# Patient Record
Sex: FEMALE | Race: WHITE | Employment: OTHER | URBAN - METROPOLITAN AREA
[De-identification: names, ages, dates, MRNs, and addresses within clinical notes are randomized per-mention and may not be internally consistent; named-entity substitution may affect disease eponyms.]

---

## 2017-01-27 ENCOUNTER — HOSPITAL ENCOUNTER (OUTPATIENT)
Dept: CT IMAGING | Age: 72
Discharge: HOME | End: 2017-01-27
Attending: FAMILY MEDICINE
Payer: MEDICARE

## 2017-01-27 DIAGNOSIS — R42 DIZZINESS: ICD-10-CM

## 2017-01-27 PROCEDURE — 70450 CT HEAD/BRAIN W/O DYE: CPT

## 2017-01-30 PROBLEM — Z82.49 FAMILY HISTORY OF CARDIOVASCULAR DISEASE: Status: ACTIVE | Noted: 2017-01-30

## 2017-02-21 PROBLEM — Q21.12 PFO (PATENT FORAMEN OVALE): Status: ACTIVE | Noted: 2017-02-21

## 2017-11-14 ENCOUNTER — HOSPITAL ENCOUNTER (OUTPATIENT)
Dept: MAMMOGRAPHY | Age: 72
Discharge: HOME | End: 2017-11-14
Attending: FAMILY MEDICINE
Payer: MEDICARE

## 2017-11-14 DIAGNOSIS — Z12.31 ENCOUNTER FOR SCREENING MAMMOGRAM FOR MALIGNANT NEOPLASM OF BREAST: ICD-10-CM

## 2017-11-14 PROCEDURE — 77067 SCR MAMMO BI INCL CAD: CPT

## 2018-04-10 ENCOUNTER — ANESTHESIA EVENT (OUTPATIENT)
Dept: ENDOSCOPY | Age: 73
End: 2018-04-10
Payer: MEDICARE

## 2018-04-11 ENCOUNTER — HOSPITAL ENCOUNTER (OUTPATIENT)
Age: 73
Setting detail: OUTPATIENT SURGERY
Discharge: HOME | End: 2018-04-11
Attending: INTERNAL MEDICINE | Admitting: INTERNAL MEDICINE
Payer: MEDICARE

## 2018-04-11 ENCOUNTER — ANESTHESIA (OUTPATIENT)
Dept: ENDOSCOPY | Age: 73
End: 2018-04-11
Payer: MEDICARE

## 2018-04-11 VITALS
BODY MASS INDEX: 25.46 KG/M2 | DIASTOLIC BLOOD PRESSURE: 86 MMHG | RESPIRATION RATE: 18 BRPM | HEART RATE: 54 BPM | HEIGHT: 68 IN | SYSTOLIC BLOOD PRESSURE: 160 MMHG | TEMPERATURE: 98 F | WEIGHT: 168 LBS | OXYGEN SATURATION: 99 %

## 2018-04-11 LAB — GLUCOSE BLD STRIP.AUTO-MCNC: 115 MG/DL (ref 65–100)

## 2018-04-11 PROCEDURE — 82962 GLUCOSE BLOOD TEST: CPT

## 2018-04-11 PROCEDURE — 74011000250 HC RX REV CODE- 250

## 2018-04-11 PROCEDURE — 76040000025: Performed by: INTERNAL MEDICINE

## 2018-04-11 PROCEDURE — 76060000032 HC ANESTHESIA 0.5 TO 1 HR: Performed by: INTERNAL MEDICINE

## 2018-04-11 PROCEDURE — 88305 TISSUE EXAM BY PATHOLOGIST: CPT | Performed by: INTERNAL MEDICINE

## 2018-04-11 PROCEDURE — 74011250636 HC RX REV CODE- 250/636: Performed by: ANESTHESIOLOGY

## 2018-04-11 PROCEDURE — 74011250636 HC RX REV CODE- 250/636

## 2018-04-11 PROCEDURE — 77030009426 HC FCPS BIOP ENDOSC BSC -B: Performed by: INTERNAL MEDICINE

## 2018-04-11 RX ORDER — SODIUM CHLORIDE, SODIUM LACTATE, POTASSIUM CHLORIDE, CALCIUM CHLORIDE 600; 310; 30; 20 MG/100ML; MG/100ML; MG/100ML; MG/100ML
100 INJECTION, SOLUTION INTRAVENOUS CONTINUOUS
Status: DISCONTINUED | OUTPATIENT
Start: 2018-04-11 | End: 2018-04-11 | Stop reason: HOSPADM

## 2018-04-11 RX ORDER — PROPOFOL 10 MG/ML
INJECTION, EMULSION INTRAVENOUS
Status: DISCONTINUED | OUTPATIENT
Start: 2018-04-11 | End: 2018-04-11 | Stop reason: HOSPADM

## 2018-04-11 RX ORDER — PROPOFOL 10 MG/ML
INJECTION, EMULSION INTRAVENOUS AS NEEDED
Status: DISCONTINUED | OUTPATIENT
Start: 2018-04-11 | End: 2018-04-11 | Stop reason: HOSPADM

## 2018-04-11 RX ORDER — LIDOCAINE HYDROCHLORIDE 20 MG/ML
INJECTION, SOLUTION EPIDURAL; INFILTRATION; INTRACAUDAL; PERINEURAL AS NEEDED
Status: DISCONTINUED | OUTPATIENT
Start: 2018-04-11 | End: 2018-04-11 | Stop reason: HOSPADM

## 2018-04-11 RX ORDER — SODIUM CHLORIDE 0.9 % (FLUSH) 0.9 %
5-10 SYRINGE (ML) INJECTION AS NEEDED
Status: DISCONTINUED | OUTPATIENT
Start: 2018-04-11 | End: 2018-04-11 | Stop reason: HOSPADM

## 2018-04-11 RX ADMIN — LIDOCAINE HYDROCHLORIDE 50 MG: 20 INJECTION, SOLUTION EPIDURAL; INFILTRATION; INTRACAUDAL; PERINEURAL at 12:16

## 2018-04-11 RX ADMIN — SODIUM CHLORIDE, SODIUM LACTATE, POTASSIUM CHLORIDE, AND CALCIUM CHLORIDE: 600; 310; 30; 20 INJECTION, SOLUTION INTRAVENOUS at 12:08

## 2018-04-11 RX ADMIN — PROPOFOL 30 MG: 10 INJECTION, EMULSION INTRAVENOUS at 12:18

## 2018-04-11 RX ADMIN — PROPOFOL 200 MCG/KG/MIN: 10 INJECTION, EMULSION INTRAVENOUS at 12:16

## 2018-04-11 RX ADMIN — SODIUM CHLORIDE, SODIUM LACTATE, POTASSIUM CHLORIDE, AND CALCIUM CHLORIDE 100 ML/HR: 600; 310; 30; 20 INJECTION, SOLUTION INTRAVENOUS at 11:55

## 2018-04-11 RX ADMIN — PROPOFOL 50 MG: 10 INJECTION, EMULSION INTRAVENOUS at 12:16

## 2018-04-11 NOTE — ANESTHESIA PREPROCEDURE EVALUATION
Anesthetic History               Review of Systems / Medical History  Patient summary reviewed and pertinent labs reviewed    Pulmonary                   Neuro/Psych       CVA (Recent difficulty finding words)  TIA and psychiatric history (Anxiety)     Cardiovascular    Hypertension: well controlled                Comments: 3 Mets without SOB    PFO      Denies CP, SOB or changes in functional status   GI/Hepatic/Renal  Within defined limits              Endo/Other        Arthritis     Other Findings   Comments: Chronic fatigue             Physical Exam    Airway  Mallampati: II  TM Distance: 4 - 6 cm  Neck ROM: normal range of motion   Mouth opening: Normal     Cardiovascular    Rhythm: regular  Rate: normal      Pertinent negatives: No murmur, JVD and peripheral edema   Dental    Dentition: Caps/crowns     Pulmonary  Breath sounds clear to auscultation               Abdominal  GI exam deferred       Other Findings            Anesthetic Plan    ASA: 3  Anesthesia type: total IV anesthesia          Induction: Intravenous  Anesthetic plan and risks discussed with: Patient      Will plan for extreme caution with IV tubing and air given PFO.

## 2018-04-11 NOTE — PROCEDURES
[facility] [address]  PROCEDURE NOTE    Adela Lowery  MR#: [id number]  : 1945  ACCOUNT #: [de-identified]   DATE OF SERVICE: 2018    SUBJECTIVE:  A 31-year-old female is to undergo colonoscopy with a prior history of colon polyps. Colonoscopy is done today to confirm the presence or absence of recurrent polypoid lesions. ANESTHESIA:  As per MAC anesthesia. INSTRUMENT:  PCF (pediatric) H190L videocolonoscope. PROCEDURE:  The patient was placed in left lateral decubitus position. Rectal examination was performed and was unremarkable. The pediatric colonoscope was inserted in the rectal vault and the procedure was begun. Under direct visualization, the cecum was reached and ileocecal valve was identified. We had to traverse a quite tortuous sigmoid colon, which was diverticular filled. The pediatric scope was quite useful traversing this area. The endoscope was removed from the cecum. The cecum was unremarkable. In the proximal ascending colon, there was a tattoo previously placed. It was still evident today. Adjacent to this, there was a small (approximately 3 mm) polyp that was biopsied away completely. After adequate hemostasis, the remaining ascending colon was visualized and was unremarkable. The transverse, TC, and sigmoid colon were also unremarkable except for the sigmoid diverticulosis. In the rectum, retroflexed view of the anal verge revealed no other abnormalities. The endoscope was removed from the patient. The patient tolerated the procedure well and was taken to the recovery area in stable condition. IMPRESSION  1. Small ascending colon polyp that was biopsied away. 2.  Significant sigmoid diverticular disease, diverticulosis. 3.  Tortuous angulated sigmoid colon. 4.  Right ascending colon tattoo in place. PLAN AND DIAGNOSTIC:    1. Follow up biopsy.   2.  Followup colonoscopy in 3-5 years depending on results of biopsy. THERAPEUTIC:  High fiber, low fat diet.       MD JADIEL Goss / CHIARA  D: 04/11/2018 12:56     T: 04/11/2018 15:51  JOB #: [id number]  CC: Andrez Gonzalez MD  PARTNER MD  [address] [place] [address],[address] [place] [address]

## 2018-04-11 NOTE — ANESTHESIA POSTPROCEDURE EVALUATION
Post-Anesthesia Evaluation and Assessment    Patient: Pao Mccord MRN: [id number]  SSN: [id number]    YOB: 1945  Age: 67 y.o. Sex: female       Cardiovascular Function/Vital Signs  Visit Vitals    BP 94/51    Pulse 60    Temp 36.7 °C (98 °F)    Resp 14    Ht 5' 7.5\" (1.715 m)    Wt 76.2 kg (168 lb)    SpO2 92%    Breastfeeding No    BMI 25.92 kg/m2       Patient is status post total IV anesthesia anesthesia for Procedure(s):  COLONOSCOPY  BMI 26  ENDOSCOPIC POLYPECTOMY. Nausea/Vomiting: None    Postoperative hydration reviewed and adequate. Pain:  Pain Scale 1: Numeric (0 - 10) (04/11/18 1124)  Pain Intensity 1: 0 (04/11/18 1124)   Managed    Neurological Status: At baseline    Mental Status and Level of Consciousness: Arousable    Pulmonary Status:   O2 Device: Nasal cannula (04/11/18 1249)   Adequate oxygenation and airway patent    Complications related to anesthesia: None    Post-anesthesia assessment completed.  No concerns    Signed By: Steven Kasper MD     April 11, 2018

## 2018-04-11 NOTE — IP AVS SNAPSHOT
[address] 
[phone] Patient: Prasanna Felton MRN: [id number] :1945 About your hospitalization You were admitted on:  2018 You last received care in the:  [facility] You were discharged on:  2018 Why you were hospitalized Your primary diagnosis was:  Not on File Follow-up Information Follow up With Details Comments Contact Info Darla Councilman, MD   [address] Partner MD [place] [address] [phone] Discharge Orders None A check norm indicates which time of day the medication should be taken. My Medications CHANGE how you take these medications Instructions Each Dose to Equal  
 Morning Noon Evening Bedtime  
 dilTIAZem  mg ER capsule Commonly known as:  CARDIZEM CD What changed:  when to take this Your last dose was: Your next dose is: Take 1 Cap by mouth daily. 180 mg CONTINUE taking these medications Instructions Each Dose to Equal  
 Morning Noon Evening Bedtime  
 aspirin delayed-release 81 mg tablet Your last dose was: Your next dose is: Take 81 mg by mouth daily (after lunch). 81 mg  
    
   
   
   
  
 CRESTOR 10 mg tablet Generic drug:  rosuvastatin Your last dose was: Your next dose is: Take 10 mg by mouth nightly. 10 mg  
    
   
   
   
  
 irbesartan 75 mg tablet Commonly known as:  AVAPRO Your last dose was: Your next dose is: Take 75 mg by mouth nightly. 75 mg KLOR-CON M20 20 mEq tablet Generic drug:  potassium chloride Your last dose was: Your next dose is: Take  by mouth two (2) times a day. L-Methylfolate-Y26-Heiwnwxdcr tablet Commonly known as:  Beverly Miranda Your last dose was: Your next dose is: Take 1 Tab by mouth. 1 Tab  
    
   
   
   
  
 montelukast 10 mg tablet Commonly known as:  SINGULAIR Your last dose was: Your next dose is: Take 10 mg by mouth daily (after lunch). 10 mg  
    
   
   
   
  
 oxazepam 15 mg capsule Commonly known as:  SERAX Your last dose was: Your next dose is: Take 15 mg by mouth nightly as needed for Anxiety. 15 mg  
    
   
   
   
  
 TRIAMTERENE-HYDROCHLOROTHIAZIDE 37.5 MG-25 MG CAP Your last dose was: Your next dose is:    
   
   
 1/2 tab qam and 1/2 tab qpm  
     
   
   
   
  
 zolpidem 5 mg tablet Commonly known as:  AMBIEN Your last dose was: Your next dose is: Take 5 mg by mouth nightly as needed for Sleep.  
 5 mg ZyrTEC 10 mg Cap Generic drug:  Cetirizine Your last dose was: Your next dose is: Take  by mouth. Discharge Instructions Gastrointestinal Colonoscopy/Flexible Sigmoidoscopy - Lower Exam Discharge Instructions 1. Call Dr. Leslie Foster for any problems or questions. 2. Contact the doctors office for follow up appointment as directed 3. Medication may cause drowsiness for several hours, therefore, do not drive or operate machinery for remainder of the day. 4. No alcohol today. 5. Ordinarily, you may resume regular diet and activity after exam unless otherwise specified by your physician. 6. Because of air put into your colon during exam, you may experience some abdominal distension, relieved by the passage of gas, for several hours. 7. Contact your physician if you have any of the following: 
a. Excessive amount of bleeding  large amount when having a bowel movement.   Occasional specks of blood with bowel movement would not be unusual. 
 b. Severe abdominal pain 
c. Fever or Chills 8. Polyp Removal  follow these additional instructions 
a. Take Metamucil  1 tablespoon in juice every morning for 3 days b. No Aspirin, Advil, Aleve, Nuprin, Ibuprofen, or medications that contain these drugs for 2 weeks. Any additional instructions: Follow up biopsies. Next colonoscopy in 3-5 years. High fiber diet. Instructions given to Maribeth Davison and other family members. Instructions given by:  Dr. Geoff Anton Introducing [facility]! [employer] introduces [employer] patient portal. Now you can access parts of your medical record, email your doctor's office, and request medication refills online. 1. In your internet browser, go to [email or web address] 2. Click on the First Time User? Click Here link in the Sign In box. You will see the New Member Sign Up page. 3. Enter your [employer] Access Code exactly as it appears below. You will not need to use this code after youve completed the sign-up process. If you do not sign up before the expiration date, you must request a new code. · Market [employer] Access Code: [email or web address] Expires: 7/8/2018  4:25 PM 
 
4. Enter the last four digits of your Social Security Number (xxxx) and Date of Birth (mm/dd/yyyy) as indicated and click Submit. You will be taken to the next sign-up page. 5. Create a Market Wire ID. This will be your Market Wire login ID and cannot be changed, so think of one that is secure and easy to remember. 6. Create a [employer] password. You can change your password at any time. 7. Enter your Password Reset Question and Answer. This can be used at a later time if you forget your password. 8. Enter your e-mail address. You will receive e-mail notification when new information is available in [address] 9. Click Sign Up. You can now view and download portions of your medical record.  
10. Click the Download Summary menu link to download a portable copy of your medical information. If you have questions, please visit the Frequently Asked Questions section of the [employer] website. Remember, [facility] is NOT to be used for urgent needs. For medical emergencies, dial 911. Now available from your iPhone and Android! Introducing Reji Valadez As a [facility] [employer] [employer] [facility] patient, I wanted to make you aware of our electronic visit tool called Reji Valadez. [employer] allows you to connect within minutes with a medical provider 24 hours a day, seven days a week via a mobile device or tablet or logging into a secure website from your computer. You can access Reji Valadez from anywhere in the [place]. A virtual visit might be right for you when you have a simple condition and feel like you just dont want to get out of bed, or cant get away from work for an appointment, when your regular [facility] provider is not available (evenings, weekends or holidays), or when youre out of town and need minor care. Electronic visits cost only $49 and if the [employer] provider determines a prescription is needed to treat your condition, one can be electronically transmitted to a nearby pharmacy*. Please take a moment to enroll today if you have not already done so. The enrollment process is free and takes just a few minutes. To enroll, please download the [employer] dante to your tablet or phone, or visit [email or web address] to enroll on your computer. And, as an [address] [place] [address] patient with a F[employer] account, the results of your visits will be scanned into your electronic medical record and your primary care provider will be able to view the scanned results. We urge you to continue to see your regular [facility] provider for your ongoing medical care.   And while your primary care provider may not be the one available when you seek a Reji Valadez virtual visit, the peace of mind you get from getting a real diagnosis real time can be priceless. For more information on Reji Valadez, view our Frequently Asked Questions (FAQs) at [email or web address]. Sincerely, 
 
Yomi Richardson MD 
Chief Medical Officer 50[address] *:  certain medications cannot be prescribed via Reji Valadez Providers Seen During Your Hospitalization Provider Specialty Primary office phone Rosealee Baumgarten, MD Gastroenterology [phone] Your Primary Care Physician (PCP) Primary Care Physician Office Phone Office Fax Yamilex Colindres [phone] You are allergic to the following Allergen Reactions Lisinopril Cough Sulfa (Sulfonamide Antibiotics) Rash Recent Documentation Height Weight Breastfeeding? BMI OB Status Smoking Status 1.715 m 76.2 kg No 25.92 kg/m2 Hysterectomy Former Smoker Emergency Contacts Name Discharge Info Relation Home Work Mobile Aracelis Earing  Child [2] [phone] Debbie Escobedo  Other Relative [6] [phone] Patient Belongings The following personal items are in your possession at time of discharge: 
  Dental Appliances: None  Visual Aid: Glasses Please provide this summary of care documentation to your next provider. Signatures-by signing, you are acknowledging that this After Visit Summary has been reviewed with you and you have received a copy. Patient Signature:  ____________________________________________________________ Date:  ____________________________________________________________  
  
Krystle Brennan Provider Signature:  ____________________________________________________________ Date:  ____________________________________________________________

## 2018-04-11 NOTE — ROUTINE PROCESS
Discharge instructions given to daughter in law. IV discontinued with skin c/d/i. Pt has passed flatus and tolerating liquids well. Pt ready for discharge.

## 2018-04-11 NOTE — H&P
Gastrointestinal Progress Note    4/11/2018    Admit Date: 4/11/2018    Subjective:     Patient is NPO for a colonoscopy. Pain: Patient complains of no bdominal pain. Bowel Movements: Normal    Bleeding:  None    Current Facility-Administered Medications   Medication Dose Route Frequency    lactated Ringers infusion  100 mL/hr IntraVENous CONTINUOUS    lactated Ringers infusion  100 mL/hr IntraVENous CONTINUOUS    sodium chloride (NS) flush 5-10 mL  5-10 mL IntraVENous PRN        Objective:     Blood pressure 168/79, pulse (!) 56, temperature 97.5 °F (36.4 °C), resp. rate 16, height 5' 7.5\" (1.715 m), weight 76.2 kg (168 lb), SpO2 94 %, not currently breastfeeding. EXAM:  HEART: regular rate and rhythm, S1, S2 normal, no murmur, click, rub or gallop, LUNGS: chest clear, no wheezing, rales, normal symmetric air entry, Heart exam - S1, S2 normal, no murmur, no gallop, rate regular and ABDOMEN:  Bowel sounds are normal, liver is not enlarged, spleen is not enlarged    Data Review    Recent Results (from the past 24 hour(s))   GLUCOSE, POC    Collection Time: 04/11/18 11:53 AM   Result Value Ref Range    Glucose (POC) 115 (H) 65 - 100 mg/dL       Assessment:     Active Problems:  History of colon polyps  PFO  History of CVA      Plan:     Colonoscopy--risks (bleed,perforation, infection, untoward CV or resp. Event, mortality, etc.), benefits and alternatives were discussed with the patient who agrees to proceed.   Truong Cervantes MD

## 2018-04-11 NOTE — DISCHARGE INSTRUCTIONS
Gastrointestinal Colonoscopy/Flexible Sigmoidoscopy - Lower Exam Discharge Instructions  1. Call Dr. Geena Castaneda for any problems or questions. 2. Contact the doctors office for follow up appointment as directed  3. Medication may cause drowsiness for several hours, therefore, do not drive or operate machinery for remainder of the day. 4. No alcohol today. 5. Ordinarily, you may resume regular diet and activity after exam unless otherwise specified by your physician. 6. Because of air put into your colon during exam, you may experience some abdominal distension, relieved by the passage of gas, for several hours. 7. Contact your physician if you have any of the following:  a. Excessive amount of bleeding - large amount when having a bowel movement. Occasional specks of blood with bowel movement would not be unusual.  b. Severe abdominal pain  c. Fever or Chills  8. Polyp Removal - follow these additional instructions  a. Take Metamucil - 1 tablespoon in juice every morning for 3 days  b. No Aspirin, Advil, Aleve, Nuprin, Ibuprofen, or medications that contain these drugs for 2 weeks. Any additional instructions: Follow up biopsies. Next colonoscopy in 3-5 years. High fiber diet. Instructions given to Herbert Arenas and other family members.   Instructions given by:  Dr. Geena Castaneda

## 2019-12-30 ENCOUNTER — HOSPITAL ENCOUNTER (OUTPATIENT)
Dept: MAMMOGRAPHY | Age: 74
Discharge: HOME | End: 2019-12-30
Attending: FAMILY MEDICINE
Payer: MEDICARE

## 2019-12-30 DIAGNOSIS — Z12.31 VISIT FOR SCREENING MAMMOGRAM: ICD-10-CM

## 2019-12-30 PROCEDURE — 77067 SCR MAMMO BI INCL CAD: CPT

## 2021-07-14 ENCOUNTER — HOSPITAL ENCOUNTER (OUTPATIENT)
Dept: LAB | Age: 76
Discharge: HOME | End: 2021-07-14

## 2021-07-14 PROCEDURE — 88305 TISSUE EXAM BY PATHOLOGIST: CPT

## 2022-03-18 PROBLEM — Z82.49 FAMILY HISTORY OF CARDIOVASCULAR DISEASE: Status: ACTIVE | Noted: 2017-01-30

## 2022-03-18 PROBLEM — Q21.12 PFO (PATENT FORAMEN OVALE): Status: ACTIVE | Noted: 2017-02-21

## 2022-05-02 ENCOUNTER — HOSPITAL ENCOUNTER (EMERGENCY)
Age: 77
Discharge: HOME | End: 2022-05-02
Attending: EMERGENCY MEDICINE
Payer: MEDICARE

## 2022-05-02 VITALS
TEMPERATURE: 98.1 F | SYSTOLIC BLOOD PRESSURE: 159 MMHG | HEIGHT: 67 IN | WEIGHT: 180 LBS | DIASTOLIC BLOOD PRESSURE: 82 MMHG | BODY MASS INDEX: 28.25 KG/M2 | HEART RATE: 58 BPM | OXYGEN SATURATION: 95 % | RESPIRATION RATE: 16 BRPM

## 2022-05-02 DIAGNOSIS — R04.0 EPISTAXIS: Primary | ICD-10-CM

## 2022-05-02 LAB
APTT PPP: 33.3 SEC (ref 24.1–35.1)
BASOPHILS # BLD: 0 K/UL (ref 0–0.2)
BASOPHILS NFR BLD: 0 % (ref 0–2)
DIFFERENTIAL METHOD BLD: ABNORMAL
EOSINOPHIL # BLD: 0.1 K/UL (ref 0–0.8)
EOSINOPHIL NFR BLD: 1 % (ref 0.5–7.8)
ERYTHROCYTE [DISTWIDTH] IN BLOOD BY AUTOMATED COUNT: 14.7 % (ref 11.9–14.6)
HCT VFR BLD AUTO: 41.6 % (ref 35.8–46.3)
HGB BLD-MCNC: 13.7 G/DL (ref 11.7–15.4)
IMM GRANULOCYTES # BLD AUTO: 0 K/UL (ref 0–0.5)
IMM GRANULOCYTES NFR BLD AUTO: 0 % (ref 0–5)
INR PPP: 1
LYMPHOCYTES # BLD: 1.7 K/UL (ref 0.5–4.6)
LYMPHOCYTES NFR BLD: 21 % (ref 13–44)
MCH RBC QN AUTO: 27.9 PG (ref 26.1–32.9)
MCHC RBC AUTO-ENTMCNC: 32.9 G/DL (ref 31.4–35)
MCV RBC AUTO: 84.7 FL (ref 79.6–97.8)
MONOCYTES # BLD: 0.9 K/UL (ref 0.1–1.3)
MONOCYTES NFR BLD: 12 % (ref 4–12)
NEUTS SEG # BLD: 5.3 K/UL (ref 1.7–8.2)
NEUTS SEG NFR BLD: 66 % (ref 43–78)
NRBC # BLD: 0 K/UL (ref 0–0.2)
PLATELET # BLD AUTO: 302 K/UL (ref 150–450)
PMV BLD AUTO: 9.3 FL (ref 9.4–12.3)
PROTHROMBIN TIME: 13.2 SEC (ref 12.6–14.5)
RBC # BLD AUTO: 4.91 M/UL (ref 4.05–5.2)
WBC # BLD AUTO: 8 K/UL (ref 4.3–11.1)

## 2022-05-02 PROCEDURE — 74011250637 HC RX REV CODE- 250/637: Performed by: PHYSICIAN ASSISTANT

## 2022-05-02 PROCEDURE — 85025 COMPLETE CBC W/AUTO DIFF WBC: CPT

## 2022-05-02 PROCEDURE — 99283 EMERGENCY DEPT VISIT LOW MDM: CPT

## 2022-05-02 PROCEDURE — 85730 THROMBOPLASTIN TIME PARTIAL: CPT

## 2022-05-02 PROCEDURE — 85610 PROTHROMBIN TIME: CPT

## 2022-05-02 RX ORDER — OXYMETAZOLINE HCL 0.05 %
2 SPRAY, NON-AEROSOL (ML) NASAL
Status: COMPLETED | OUTPATIENT
Start: 2022-05-02 | End: 2022-05-02

## 2022-05-02 RX ADMIN — OXYMETAZOLINE HCL 2 SPRAY: 0.05 SPRAY NASAL at 19:53

## 2022-05-02 NOTE — DISCHARGE INSTRUCTIONS
Your blood work is normal.  If you go home and have another episode of bleeding I would recommend using 2 squirts of Afrin in each nostril and then nasal clamping your nose for 20 to 30 minutes at a time. Slightly tilts her head forward not backward so you do not swallow the blood. If bleeding continues more than a few hours or you feel lightheaded or dizzy feel free to return to the ER.   I would also hold your Zyrtec and Flonase for the next week if possible as this can increase your likelihood of having a nosebleed

## 2022-05-02 NOTE — ED PROVIDER NOTES
Pt arrives by EMS for epistaxis that began around 1pm after blowing her nose. She had bleeding from the left nostril. Pt takes Brillinta, aspirin, flonase, and zyrtec. Pt saw her PCP today who couldn't get the bleeding to stop. He tried ice and some pressure. Her PCP told her to stop the Brilinta for a few days as well as aspirin. Pt has had nasal clamp on for 1.5 hours. Bleeding has improved now in triage.   Patient denies feeling lightheaded or dizzy             Past Medical History:   Diagnosis Date    Anxiety     Arthritis     osteo , in feet and low back    Chronic fatigue syndrome     Chronic pain     arthritic    Colonic adenoma 4/13    removed at colonoscopy    Family history of cardiovascular disease 1/30/2017    Hypertension     Pain of right great toe     with bone spurs    Patent foramen ovale     tiney via echo from 3/2015    Psychiatric disorder     depression, controlled by medication    Stroke ([facility] [address]     2013, 7 mm in left thalmus    Transient ischemic attack (TIA)     1 TIA 1/2015 and 6 TIA in June of this year , no residual weakness occassionlly difficulty getting words       Past Surgical History:   Procedure Laterality Date    COLONOSCOPY N/A 4/11/2018    COLONOSCOPY  BMI 26 performed by Arsenio Mondragon MD at [facility] ENDOSCOPY    HX GI      polyp removal via colonoscopy    HX GYN      tubal ligation    HX HYSTERECTOMY  age 50, 12   [de-identified] TONSILLECTOMY  age 35    tonsills removed         Family History:   Problem Relation Age of Onset    Lung Disease Mother     Heart Disease Father     Breast Cancer Neg Hx        Social History     Socioeconomic History    Marital status:      Spouse name: Not on file    Number of children: Not on file    Years of education: Not on file    Highest education level: Not on file   Occupational History    Not on file   Tobacco Use    Smoking status: Former Smoker     Packs/day: 4.00     Years: 0.50     Pack years: 2.00     Quit date: 1970     Years since quittin.3    Smokeless tobacco: Never Used   Substance and Sexual Activity    Alcohol use: Yes     Comment: occasionally    Drug use: No    Sexual activity: Never   Other Topics Concern    Not on file   Social History Narrative    Not on file     Social Determinants of Health     Financial Resource Strain:     Difficulty of Paying Living Expenses: Not on file   Food Insecurity:     Worried About Running Out of Food in the Last Year: Not on file    Crystal of Food in the Last Year: Not on file   Transportation Needs:     Lack of Transportation (Medical): Not on file    Lack of Transportation (Non-Medical): Not on file   Physical Activity:     Days of Exercise per Week: Not on file    Minutes of Exercise per Session: Not on file   Stress:     Feeling of Stress : Not on file   Social Connections:     Frequency of Communication with Friends and Family: Not on file    Frequency of Social Gatherings with Friends and Family: Not on file    Attends Synagogue Services: Not on file    Active Member of [address] [place] [address] or Organizations: Not on file    Attends Club or Organization Meetings: Not on file    Marital Status: Not on file   Intimate Partner Violence:     Fear of Current or Ex-Partner: Not on file    Emotionally Abused: Not on file    Physically Abused: Not on file    Sexually Abused: Not on file   Housing Stability:     Unable to Pay for Housing in the Last Year: Not on file    Number of [place] in the Last Year: Not on file    Unstable Housing in the Last Year: Not on file         ALLERGIES: Lisinopril and Sulfa (sulfonamide antibiotics)    Review of Systems   HENT:        Epistaxis   Neurological: Negative for dizziness and headaches. All other systems reviewed and are negative.       Vitals:    22 1643   BP: (!) 142/77   Pulse: (!) 58   Resp: 16   Temp: 98.1 °F (36.7 °C)   SpO2: 95%   Weight: 81.6 kg (180 lb)   Height: 5' 7\" (1.702 m) Physical Exam  Vitals and nursing note reviewed. Constitutional:       Appearance: Normal appearance. HENT:      Head: Normocephalic and atraumatic. Nose:      Comments: There is dried blood in both nostrils slightly worse on the left side but no active bleeding as of 1930. Exact source of bleeding cannot be visualized at this time. Mouth/Throat:      Mouth: Mucous membranes are moist.      Comments: No blood in the posterior oropharynx  Eyes:      Extraocular Movements: Extraocular movements intact. Conjunctiva/sclera: Conjunctivae normal.      Pupils: Pupils are equal, round, and reactive to light. Cardiovascular:      Rate and Rhythm: Normal rate and regular rhythm. Pulses: Normal pulses. Heart sounds: Normal heart sounds. Pulmonary:      Effort: Pulmonary effort is normal.      Breath sounds: Normal breath sounds. Musculoskeletal:         General: Normal range of motion. Cervical back: Normal range of motion and neck supple. Skin:     General: Skin is warm and dry. Capillary Refill: Capillary refill takes less than 2 seconds. Neurological:      General: No focal deficit present. Mental Status: She is alert. Psychiatric:         Mood and Affect: Mood normal.         Behavior: Behavior normal.         Thought Content: Thought content normal.          MDM  Number of Diagnoses or Management Options  Diagnosis management comments: Differential diagnoses: Epistaxis, anemia, thrombocytopenia    Due to high patient volume patient did have to wait in the waiting room for nearly 3 hours. When I saw her a second time around 1930 she had had the nasal clamp on for over an hour and a half. Clamp was removed and patient has no active bleeding. I will send her home with Afrin in case she has any repeat bleeding but she would prefer to not have her nostril packed at all cost as she states she did have it packed many years ago and could not tolerate it.   Patient to hold her Zyrtec and Flonase as this can also increase risk of epistaxis and her primary care physician already told her to hold her Brilinta and aspirin for a few days       Amount and/or Complexity of Data Reviewed  Clinical lab tests: reviewed    Risk of Complications, Morbidity, and/or Mortality  Presenting problems: low  Diagnostic procedures: low  Management options: low    Patient Progress  Patient progress: improved         Procedures

## 2022-05-02 NOTE — ED NOTES
Pt arrives by EMS for epistaxis that began around 1pm after blowing her nose. She had bleeding from the left nostril. Pt takes Brillinta and aspirin. Pt saw her PCP today who couldn't get the bleeding to stop. Pt has had nasal clamp for 1.5 hours. Bleeding has improved now. Minimal bleeding noted in left nostril. Patient evaluated initially in triage. Rapid Medical Evaluation was conducted and necessary orders have been placed. I have performed a medical screening exam.  Care will now be transferred to the attending physician in the emergency department.   JESIKA Fernando 4:45 PM

## 2022-05-03 NOTE — ED NOTES
I have reviewed discharge instructions with the patient. The patient verbalized understanding. Patient left ED via Discharge Method: ambulatory to Home with son. Opportunity for questions and clarification provided. Patient given 0 scripts. To continue your aftercare when you leave the hospital, you may receive an automated call from our care team to check in on how you are doing. This is a free service and part of our promise to provide the best care and service to meet your aftercare needs.  If you have questions, or wish to unsubscribe from this service please call [phone]. Thank you for Choosing our [address] [place] [address] Emergency Department.

## 2022-10-26 ENCOUNTER — HOSPITAL ENCOUNTER (EMERGENCY)
Age: 77
Discharge: HOME | End: 2022-10-26
Attending: EMERGENCY MEDICINE
Payer: MEDICARE

## 2022-10-26 VITALS
DIASTOLIC BLOOD PRESSURE: 69 MMHG | TEMPERATURE: 98 F | WEIGHT: 180 LBS | RESPIRATION RATE: 17 BRPM | OXYGEN SATURATION: 97 % | SYSTOLIC BLOOD PRESSURE: 121 MMHG | BODY MASS INDEX: 28.25 KG/M2 | HEIGHT: 67 IN | HEART RATE: 60 BPM

## 2022-10-26 DIAGNOSIS — R04.0 EPISTAXIS: Primary | ICD-10-CM

## 2022-10-26 DIAGNOSIS — E87.6 HYPOKALEMIA: ICD-10-CM

## 2022-10-26 LAB
ANION GAP SERPL CALC-SCNC: 6 MMOL/L (ref 2–11)
BASOPHILS # BLD: 0 K/UL (ref 0–0.2)
BASOPHILS NFR BLD: 0 % (ref 0–2)
BUN SERPL-MCNC: 11 MG/DL (ref 8–23)
CALCIUM SERPL-MCNC: 9.1 MG/DL (ref 8.3–10.4)
CHLORIDE SERPL-SCNC: 103 MMOL/L (ref 101–110)
CO2 SERPL-SCNC: 28 MMOL/L (ref 21–32)
CREAT SERPL-MCNC: 0.72 MG/DL (ref 0.6–1)
DIFFERENTIAL METHOD BLD: NORMAL
EOSINOPHIL # BLD: 0.1 K/UL (ref 0–0.8)
EOSINOPHIL NFR BLD: 1 % (ref 0.5–7.8)
ERYTHROCYTE [DISTWIDTH] IN BLOOD BY AUTOMATED COUNT: 14.1 % (ref 11.9–14.6)
GLUCOSE SERPL-MCNC: 102 MG/DL (ref 65–100)
HCT VFR BLD AUTO: 40.8 % (ref 35.8–46.3)
HGB BLD-MCNC: 13.3 G/DL (ref 11.7–15.4)
IMM GRANULOCYTES # BLD AUTO: 0 K/UL (ref 0–0.5)
IMM GRANULOCYTES NFR BLD AUTO: 0 % (ref 0–5)
INR PPP: 1
LYMPHOCYTES # BLD: 1.3 K/UL (ref 0.5–4.6)
LYMPHOCYTES NFR BLD: 18 % (ref 13–44)
MCH RBC QN AUTO: 28 PG (ref 26.1–32.9)
MCHC RBC AUTO-ENTMCNC: 32.6 G/DL (ref 31.4–35)
MCV RBC AUTO: 85.9 FL (ref 82–102)
MONOCYTES # BLD: 0.7 K/UL (ref 0.1–1.3)
MONOCYTES NFR BLD: 10 % (ref 4–12)
NEUTS SEG # BLD: 5.1 K/UL (ref 1.7–8.2)
NEUTS SEG NFR BLD: 70 % (ref 43–78)
NRBC # BLD: 0 K/UL (ref 0–0.2)
PLATELET # BLD AUTO: 251 K/UL (ref 150–450)
PMV BLD AUTO: 9.6 FL (ref 9.4–12.3)
POTASSIUM SERPL-SCNC: 3.3 MMOL/L (ref 3.5–5.1)
PROTHROMBIN TIME: 13.6 SEC (ref 12.6–14.3)
RBC # BLD AUTO: 4.75 M/UL (ref 4.05–5.2)
SODIUM SERPL-SCNC: 137 MMOL/L (ref 133–143)
WBC # BLD AUTO: 7.2 K/UL (ref 4.3–11.1)

## 2022-10-26 PROCEDURE — 85610 PROTHROMBIN TIME: CPT

## 2022-10-26 PROCEDURE — 99283 EMERGENCY DEPT VISIT LOW MDM: CPT

## 2022-10-26 PROCEDURE — 85025 COMPLETE CBC W/AUTO DIFF WBC: CPT

## 2022-10-26 PROCEDURE — 80048 BASIC METABOLIC PNL TOTAL CA: CPT

## 2022-10-26 PROCEDURE — 6370000000 HC RX 637 (ALT 250 FOR IP): Performed by: NURSE PRACTITIONER

## 2022-10-26 RX ORDER — POTASSIUM CHLORIDE 20 MEQ/1
40 TABLET, EXTENDED RELEASE ORAL ONCE
Status: COMPLETED | OUTPATIENT
Start: 2022-10-26 | End: 2022-10-26

## 2022-10-26 RX ADMIN — POTASSIUM CHLORIDE 40 MEQ: 20 TABLET, EXTENDED RELEASE ORAL at 14:05

## 2022-10-26 ASSESSMENT — PAIN - FUNCTIONAL ASSESSMENT
PAIN_FUNCTIONAL_ASSESSMENT: NONE - DENIES PAIN
PAIN_FUNCTIONAL_ASSESSMENT: NONE - DENIES PAIN

## 2022-10-26 NOTE — ED NOTES
I have reviewed discharge instructions with the patient. The patient verbalized understanding. Patient left ED via Discharge Method: ambulatory to Home with self. Opportunity for questions and clarification provided. Patient given 0 scripts. No e-sign. To continue your aftercare when you leave the hospital, you may receive an automated call from our care team to check in on how you are doing. This is a free service and part of our promise to provide the best care and service to meet your aftercare needs.  If you have questions, or wish to unsubscribe from this service please call [phone]. Thank you for Choosing our [facility] Emergency Department.          Toño Raymundo RN  10/26/22 6553

## 2022-10-26 NOTE — ED PROVIDER NOTES
[facility] Emergency Department Provider Note                   PCP:                Sue Colin MD               Age: 68 y.o. Sex: female       ICD-10-CM    1. Epistaxis  R04.0       2. Hypokalemia  E87.6           DISPOSITION      DC    [facility] as charted. Pt neck is supple, no meningeal signs. Lungs are clear to auscultation, no diminished sounds. Abdomen is soft and not tender. When I initially met the patient, she was lying supine on a flat ED bed. No bleeding and denies complaint. States her PCP sent her to the ED for management. States she has not had any nasal bleeding in a number of hours. She states she does not know what causes nosebleeds to start. They resolve with direct pressure. She takes blood thinners. She denies falls or injury, dizziness or lightheadedness, visual change, headache, head injury, numbness/tingling/weakness and all other complaint. Denies fevers or chills, shortness of breath. She is very well-appearing, alert appropriate oriented, appears no acute distress, toxic appearing. Skin is warm and dry. She has a very reassuring exam with no source of the bleeding, no current bleeding, no blood in posterior oropharynx. She appears well-nourished, well-hydrated, she is hemodynamically stable. Obtained labs, check H and H with concern for frequent bleeding and the patient's report of a ongoing fatigue for last couple of weeks. Labs are largely reassuring, normal H&H. Slightly hypokalemic. Repleted orally. Feel stable for discharge home. Provided contact number with on-call ENT and advised to call upon discharge to schedule earliest possible follow-up. We discussed therapeutic measures and gave very strict return precautions. Clinical suspicion of arrhythmia, significant dehydration or electrolyte abnormality, hypovolemia, stroke, other emergent process. Strict return to ED for care instruction provided. Advised to follow-up with PCP in 1-2 days.  Return to ED immediately for any new, worsening, concerning symptoms. Patient is very well-hydrated appearing, no distress, pleasant and conversational.  Nontoxic-appearing, tolerating oral intake. Patient is hemodynamically stable and in no acute distress. Patient is not ill-appearing. Discussed patient with attending who reviewed the patient's results and collaborated on care planning. All findings and plans were discussed with the patient. Patient verbalizes desire to be discharged home at this time. All questions answered. Discussed with the patient that an unremarkable evaluation in the ED does not preclude the development or presence of a serious or life threatening condition. Patient was instructed to return immediately for any worsening or change in current symptoms, or if symptoms do not continue to improve. I instructed them to follow up with their primary care provider, own specialist, or medical provider that I am recommending for him within the next 2-3 days     the patient acknowledged understanding plan of care and affirmed approval. Patient is discharged home, with no further complaint. No orders of the defined types were placed in this encounter. Rohini Garcia is a 68 y.o. female who presents to the Emergency Department with chief complaint of    Chief Complaint   Patient presents with    Epistaxis      HPI  70-year-old female presents to the ED with complaint of frequent nosebleeds x10 days. Patient states that multiple but not daily nosebleeds throughout that time. States that she is not certain what causes these. States she is able to make them stop within minutes with direct pressure. Patient states that she spoke with her PCP a few days ago about this and he advised her to stop her Brilinta only for 1-2 days and then resume. States that PCP advised that if she continues have nosebleeds to come to the ED.   Patient states that her last nosebleed was earlier this morning, she was able to stop the bleeding within a few minutes of pressure. States she is takes Brilinta and daily aspirin. Brilinta is taken for due to prior stroke. Patient denies headache, visual change, dizziness, numbness/tingling's weakness all other complaints. She does report feeling fatigued for the past few weeks which she relates to chronic pain. States she discontinued use of all NSAIDs at onset of her nosebleeds. Epistaxis has been from the right naris. Denies any active bleeding. Denies having any blood in the posterior oropharynx. Denies fever/chills, change in appetite, weight loss, decreased oral intake, blurred vision, headaches, neck pain/stiffness. Alert & oriented x 3, afebrile, hemodynamically stable, non-toxic appearing, appears in no distress. Medical/surgical/social history reviewed with the patient. Review of Systems  Constitutional: Negative for fever. Negative for appetite change, chills, diaphoresis and unexpected weight change. HENT: As in HPI     Eyes: Negative. Respiratory: As in HPI   Cardiovascular: Negative. GI/: As in HPI      Musculoskeletal: As in HPI   Skin: Negative. Allergic/Immunologic: Negative. Neurological: Negative.       Past Medical History:   Diagnosis Date    Anxiety     Arthritis     osteo , in feet and low back    Chronic fatigue syndrome     Chronic pain     arthritic    Colonic adenoma 4/13    removed at colonoscopy    Family history of cardiovascular disease 1/30/2017    Hypertension     Pain of right great toe     with bone spurs    Patent foramen ovale     tiney via echo from 3/2015    Psychiatric disorder     depression, controlled by medication    Stroke ([facility] [address]     2013, 7 mm in left thalmus    Transient ischemic attack (TIA)     1 TIA 1/2015 and 6 TIA in June of this year , no residual weakness occassionlly difficulty getting words        Past Surgical History:   Procedure Laterality Date    COLONOSCOPY N/A 4/11/2018    COLONOSCOPY  BMI 26 performed by Sloane Lao MD at [facility] ENDOSCOPY    GI      polyp removal via colonoscopy    GYN      tubal ligation    HYSTERECTOMY  age 50, 12    TONSILLECTOMY  age 35    tonsills removed        Family History   Problem Relation Age of Onset    Breast Cancer Neg Hx     Lung Disease Mother     Heart Disease Father         Social History     Socioeconomic History    Marital status:    Tobacco Use    Smoking status: Former     Packs/day: 4.00     Types: Cigarettes     Quit date: 1970     Years since quittin.8    Smokeless tobacco: Never   Substance and Sexual Activity    Alcohol use: Yes    Drug use: No         Lisinopril, Adhesive tape, and Sulfa antibiotics     Previous Medications    ASPIRIN 81 MG EC TABLET    Take 81 mg by mouth    CETIRIZINE HCL 10 MG CAPS    Take by mouth    DILTIAZEM (TIAZAC) 180 MG EXTENDED RELEASE CAPSULE    Take 180 mg by mouth daily    IRBESARTAN (AVAPRO) 75 MG TABLET    Take 75 mg by mouth    MONTELUKAST (SINGULAIR) 10 MG TABLET    Take 10 mg by mouth    OXAZEPAM (SERAX) 15 MG CAPSULE    Take 15 mg by mouth. POTASSIUM CHLORIDE (KLOR-CON M) 20 MEQ EXTENDED RELEASE TABLET    Take by mouth 2 times daily    ROSUVASTATIN (CRESTOR) 10 MG TABLET    Take 10 mg by mouth    ZOLPIDEM (AMBIEN) 5 MG TABLET    Take 5 mg by mouth. Vitals signs and nursing note reviewed. Patient Vitals for the past 4 hrs:   Temp Pulse Resp BP SpO2   10/26/22 1124 98 °F (36.7 °C) 63 16 (!) 117/58 96 %          Physical Exam   Constitutional: Oriented to person, place, and time. Appears well-developed and well-nourished. No distress. HENT:    Head: Normocephalic and atraumatic. Right Ear: External ear normal.    Left Ear: External ear normal.    Nose: Nose normal.  No epistaxis. There is dried blood in the right naris. There is no visualized sites of bleeding. No other normal finding noted. Mouth/Throat: Mouth normal.  No blood in posterior oropharynx.   Uvula midline, no erythema/exudates/edema. No drooling, no voice change. No pain with ROM of neck. Eyes: Conjunctivae are normal.   Neck: Supple. No tracheal deviation. Not tender, not rigid, no menningeal signs. Cardiovascular: Normal rate, intact distal pulses. Pulmonary/Chest: No respiratory distress. Abdominal: Soft. Musculoskeletal: No obvious deformity, erythema, edema. Neurological: Alert and oriented to person, place, and time. No dizziness, no lightheadedness, no focal deficit  Skin:  No abrasion, no lesion, no petechiae and no rash noted. Not diaphoretic. No cyanosis, erythema, or pallor. Psychiatric: Normal mood and affect. Behavior is normal.    Nursing note and vitals reviewed. Procedures      Labs Reviewed - No data to display     No orders to display                          Voice dictation software was used during the making of this note. This software is not perfect and grammatical and other typographical errors may be present. This note has not been completely proofread for errors.          MERLIN Santiago - CNP  10/26/22 2937

## 2022-10-26 NOTE — ED TRIAGE NOTES
Patient advises that she has had about 6 nose bleeds over past 8 days from right nare only. Patient advises that she can normally get it stopped in about 10 minutes. Patient advises that she stopped taking her Brilinta about 8 days ago when this stopped. Patient advises now she took maybe 1 since it started. Denies any pain. No bleeding at this time.

## 2023-04-20 ENCOUNTER — PREP FOR PROCEDURE (OUTPATIENT)
Dept: UROGYNECOLOGY | Age: 78
End: 2023-04-20

## 2023-04-20 ENCOUNTER — OFFICE VISIT (OUTPATIENT)
Dept: UROGYNECOLOGY | Age: 78
End: 2023-04-20

## 2023-04-20 DIAGNOSIS — N39.3 SUI (STRESS URINARY INCONTINENCE, FEMALE): ICD-10-CM

## 2023-04-20 DIAGNOSIS — N39.3 SUI (STRESS URINARY INCONTINENCE, FEMALE): Primary | ICD-10-CM

## 2023-04-20 DIAGNOSIS — N81.89 WEAKNESS OF PELVIC FLOOR: ICD-10-CM

## 2023-04-20 LAB
BILIRUBIN, URINE, POC: NEGATIVE
BLOOD URINE, POC: NORMAL
GLUCOSE URINE, POC: NEGATIVE
KETONES, URINE, POC: NEGATIVE
LEUKOCYTE ESTERASE, URINE, POC: NEGATIVE
NITRITE, URINE, POC: NEGATIVE
PH, URINE, POC: 7 (ref 4.6–8)
PROTEIN,URINE, POC: NEGATIVE
SPECIFIC GRAVITY, URINE, POC: 1.01 (ref 1–1.03)
URINALYSIS CLARITY, POC: CLEAR
URINALYSIS COLOR, POC: YELLOW
UROBILINOGEN, POC: NORMAL

## 2023-04-20 NOTE — ASSESSMENT & PLAN NOTE
She has weak pelvic floor muscles. We discussed the purpose of physical therapy which is to strengthen the pelvic floor muscles and teach proper coordination of those muscles. I described the anatomy of those muscles involved and their relationship to the end-organs in the pelvis. I described therapy techniques which include a combination of therapeutic exercise, biofeedback, neuromuscular re-education, home programs, and electrical stimulation, as well as therapeutic massage and ultrasound for pain.

## 2023-04-20 NOTE — PROGRESS NOTES
Quantification 4/20/2023   Anterior Wall (Aa) 0   Anterior Wall (Ba) 0   Cervix or Cuff (C) -8   Genital Haitus (gh) 2.5   Perineal Body (pb) 2.5   Total Vaginal Length (tvl) 11   Posterior Wall (Ap) -2   Posterior Wall (Bp) -2   Posterior Fornix (D) X            Pelvic floor muscles: Tender Spasm     R. Puborectalis: NO 0 /5    L. Puborectalis: NO 0 /5    R. Pubococcyg NO 0 /5    L. Pubococcyg NO 0 /5    R. Ileococcyg: NO 0 /5    L. Ileococcyg: NO 0 /5    R. Obturator Int: NO 0 /5    L. Obturator Int: NO 0 /5    R. Coccygeus: NO 0 /5    L. Coccygeus: NO 0 /5      Pelvic floor contractions: 1/5    Stress Test of ERIC: positive    Post Void Residual collected by straight catheterization: 40ML  The patient was in lithotomy position and the urethra was cleansed to maintain sterility. A 14Fr catheter was used to drain the bladder in sterile fashion. 1. ERIC (stress urinary incontinence, female)  Assessment & Plan:  Stress Incontinence  We discussed the differential diagnosis of urinary incontinence. We also discussed the pathogenesis and etiology of stress urinary incontinence. I explained the epidemiology of incontinence. I offered her options which include nothing, physical therapy, barrier treatment, and surgery. She is interested in surgical treatment. I described the surgical technique to be employed for her upcoming surgery. We discussed the anticipated postoperative course. TVT cure rates at 6 years is up to 85%. We discussed the properties of polypropylene mesh. We discussed the inert nature and the potential for complication, including infections, rejection, and erosion. The risk of erosion is <10%. We discussed the FDA warning on mesh use. Risks, benefits, indications, and alternatives of the surgery were discussed.  Risks reviewed include bleeding, infections, injury to pelvic organs (bladder, nerves, vessels, urethra, and ureters), recurrence, urinary retention, dyspareunia, anesthetic

## 2023-04-20 NOTE — ASSESSMENT & PLAN NOTE
Stress Incontinence  We discussed the differential diagnosis of urinary incontinence. We also discussed the pathogenesis and etiology of stress urinary incontinence. I explained the epidemiology of incontinence. I offered her options which include nothing, physical therapy, barrier treatment, and surgery. She is interested in surgical treatment. I described the surgical technique to be employed for her upcoming surgery. We discussed the anticipated postoperative course. TVT cure rates at 6 years is up to 85%. We discussed the properties of polypropylene mesh. We discussed the inert nature and the potential for complication, including infections, rejection, and erosion. The risk of erosion is <10%. We discussed the FDA warning on mesh use. Risks, benefits, indications, and alternatives of the surgery were discussed. Risks reviewed include bleeding, infections, injury to pelvic organs (bladder, nerves, vessels, urethra, and ureters), recurrence, urinary retention, dyspareunia, anesthetic complications, and death. We discussed that other complications could arise and that the list is too long to discuss comprehensively. She is consented for a midurethral (TOT) sling and cystoscopy, possible anterior wall repair.

## 2023-04-23 LAB
BACTERIA SPEC CULT: NORMAL
SERVICE CMNT-IMP: NORMAL

## 2023-05-01 ENCOUNTER — OFFICE VISIT (OUTPATIENT)
Dept: UROGYNECOLOGY | Age: 78
End: 2023-05-01
Payer: MEDICARE

## 2023-05-01 DIAGNOSIS — N81.89 WEAKNESS OF PELVIC FLOOR: ICD-10-CM

## 2023-05-01 DIAGNOSIS — N39.3 SUI (STRESS URINARY INCONTINENCE, FEMALE): Primary | ICD-10-CM

## 2023-05-01 PROCEDURE — 99212 OFFICE O/P EST SF 10 MIN: CPT | Performed by: OBSTETRICS & GYNECOLOGY

## 2023-05-01 PROCEDURE — G8400 PT W/DXA NO RESULTS DOC: HCPCS | Performed by: OBSTETRICS & GYNECOLOGY

## 2023-05-01 PROCEDURE — 4004F PT TOBACCO SCREEN RCVD TLK: CPT | Performed by: OBSTETRICS & GYNECOLOGY

## 2023-05-01 PROCEDURE — 1090F PRES/ABSN URINE INCON ASSESS: CPT | Performed by: OBSTETRICS & GYNECOLOGY

## 2023-05-01 PROCEDURE — 1123F ACP DISCUSS/DSCN MKR DOCD: CPT | Performed by: OBSTETRICS & GYNECOLOGY

## 2023-05-01 PROCEDURE — G8417 CALC BMI ABV UP PARAM F/U: HCPCS | Performed by: OBSTETRICS & GYNECOLOGY

## 2023-05-01 PROCEDURE — 0509F URINE INCON PLAN DOCD: CPT | Performed by: OBSTETRICS & GYNECOLOGY

## 2023-05-01 PROCEDURE — G8427 DOCREV CUR MEDS BY ELIG CLIN: HCPCS | Performed by: OBSTETRICS & GYNECOLOGY

## 2023-05-01 NOTE — PROGRESS NOTES
[facility]  [address]  Dept: [phone]    PCP:  Dionna Romeo MD    5/1/2023      HPI:  Madeline Barba is here to follow up on Follow-up  . Patient last seen 4/20 for ERIC and prolapse. Her pessary was taken out at that apt and patients ERIC is 2/3rds less that what it was with the pessary in. She is here to talk about going to PT or trying a different pessary instead of having surgery right away. No results found for this visit on 05/01/23. There were no vitals taken for this visit. 1. ERIC (stress urinary incontinence, female)  Assessment & Plan:  She would like to try PT. We discussed the purpose of physical therapy which is to strengthen the pelvic floor muscles and teach proper coordination of those muscles. I described the anatomy of those muscles involved and their relationship to the end-organs in the pelvis. I described therapy techniques which include a combination of therapeutic exercise, biofeedback, neuromuscular re-education, home programs, and electrical stimulation, as well as therapeutic massage and ultrasound for pain. We will cancel her surgery and refer to PT. Orders:  -     Ambulatory referral to Physical Therapy  2. Weakness of pelvic floor  -     Ambulatory referral to Physical Therapy     Return in about 3 months (around 8/1/2023) for ERIC.                       Mary Benjamin DO

## 2023-05-01 NOTE — ASSESSMENT & PLAN NOTE
She would like to try PT. We discussed the purpose of physical therapy which is to strengthen the pelvic floor muscles and teach proper coordination of those muscles. I described the anatomy of those muscles involved and their relationship to the end-organs in the pelvis. I described therapy techniques which include a combination of therapeutic exercise, biofeedback, neuromuscular re-education, home programs, and electrical stimulation, as well as therapeutic massage and ultrasound for pain.     We will cancel her surgery and refer to PT.

## 2023-06-01 ENCOUNTER — OFFICE VISIT (OUTPATIENT)
Dept: ENT CLINIC | Age: 78
End: 2023-06-01
Payer: MEDICARE

## 2023-06-01 VITALS — WEIGHT: 181 LBS | HEART RATE: 65 BPM | OXYGEN SATURATION: 95 % | HEIGHT: 68 IN | BODY MASS INDEX: 27.43 KG/M2

## 2023-06-01 DIAGNOSIS — J32.8 OTHER CHRONIC SINUSITIS: Primary | ICD-10-CM

## 2023-06-01 DIAGNOSIS — J34.2 NASAL SEPTAL DEVIATION: ICD-10-CM

## 2023-06-01 DIAGNOSIS — R49.0 HOARSENESS: ICD-10-CM

## 2023-06-01 PROCEDURE — 1036F TOBACCO NON-USER: CPT | Performed by: STUDENT IN AN ORGANIZED HEALTH CARE EDUCATION/TRAINING PROGRAM

## 2023-06-01 PROCEDURE — 1090F PRES/ABSN URINE INCON ASSESS: CPT | Performed by: STUDENT IN AN ORGANIZED HEALTH CARE EDUCATION/TRAINING PROGRAM

## 2023-06-01 PROCEDURE — G8427 DOCREV CUR MEDS BY ELIG CLIN: HCPCS | Performed by: STUDENT IN AN ORGANIZED HEALTH CARE EDUCATION/TRAINING PROGRAM

## 2023-06-01 PROCEDURE — G8417 CALC BMI ABV UP PARAM F/U: HCPCS | Performed by: STUDENT IN AN ORGANIZED HEALTH CARE EDUCATION/TRAINING PROGRAM

## 2023-06-01 PROCEDURE — G8400 PT W/DXA NO RESULTS DOC: HCPCS | Performed by: STUDENT IN AN ORGANIZED HEALTH CARE EDUCATION/TRAINING PROGRAM

## 2023-06-01 PROCEDURE — 1123F ACP DISCUSS/DSCN MKR DOCD: CPT | Performed by: STUDENT IN AN ORGANIZED HEALTH CARE EDUCATION/TRAINING PROGRAM

## 2023-06-01 PROCEDURE — 31231 NASAL ENDOSCOPY DX: CPT | Performed by: STUDENT IN AN ORGANIZED HEALTH CARE EDUCATION/TRAINING PROGRAM

## 2023-06-01 PROCEDURE — 99204 OFFICE O/P NEW MOD 45 MIN: CPT | Performed by: STUDENT IN AN ORGANIZED HEALTH CARE EDUCATION/TRAINING PROGRAM

## 2023-06-01 RX ORDER — FLUTICASONE PROPIONATE 50 MCG
2 SPRAY, SUSPENSION (ML) NASAL DAILY
Qty: 2 EACH | Refills: 5 | Status: SHIPPED | OUTPATIENT
Start: 2023-06-01

## 2023-06-01 ASSESSMENT — ENCOUNTER SYMPTOMS
EYE ITCHING: 0
COUGH: 1
VOMITING: 0
SHORTNESS OF BREATH: 0
VOICE CHANGE: 1
EYE REDNESS: 0

## 2023-06-01 NOTE — PROGRESS NOTES
[address] ENT Office Note    Patient: Toby Marinelli  MRN: [id number]  : 1945  Gender:  female  Vital Signs: Pulse 65   Ht 5' 7.5\" (1.715 m)   Wt 181 lb (82.1 kg)   SpO2 95%   BMI 27.93 kg/m²   Date: 2023    CC:   Chief Complaint   Patient presents with    New Patient     Chronic sinusitis       HPI:  Toby aMrinelli is a 68 y.o. female with a history of upper respiratory infection this past winter. She had mono and positive EBV titers. She has seen Dr. King Bowers with [facility]. At the time of her initial upper respiratory infection this past winter she had a significant cough in addition to hoarseness, postnasal drainage, and some facial pressure. This is all improved. She still endorses postnasal drainage and occasional hoarseness. She uses Astelin. She endorses infrequent reflux. Past Medical History, Past Surgical History, Family history, Social History, and Medications were all reviewed with the patient today and updated as necessary.      Allergies   Allergen Reactions    Latex     Cefaclor      Other reaction(s): chronic fatigue    Lisinopril Other (See Comments)    Adhesive Tape Rash    Sulfa Antibiotics Rash     Patient Active Problem List   Diagnosis    Family history of cardiovascular disease    PFO (patent foramen ovale)    Hyponatremia    Anxiety disorder    History of CVA (cerebrovascular accident)    HTN (hypertension), malignant    Weakness of pelvic floor    ERIC (stress urinary incontinence, female)     Current Outpatient Medications   Medication Sig    fluticasone (FLONASE) 50 MCG/ACT nasal spray 2 sprays by Each Nostril route daily    aspirin 81 MG EC tablet Take 1 tablet by mouth    Cetirizine HCl 10 MG CAPS Take by mouth    dilTIAZem (TIAZAC) 180 MG extended release capsule Take 1 capsule by mouth daily    montelukast (SINGULAIR) 10 MG tablet Take 1 tablet by mouth    potassium chloride (KLOR-CON M) 20 MEQ extended release tablet Take by mouth 2 times daily    rosuvastatin

## 2023-07-17 NOTE — PROGRESS NOTES
Reviewed:  7/18/2023  Updated Objective Findings:  See evaluation note from today    Treatment   THERAPEUTIC EXERCISE: (10 minutes):  Exercises per grid below to improve mobility, strength, and coordination. Required moderate visual, verbal, and tactile cues to promote proper body mechanics and promote proper body breathing techniques. Progressed resistance, range, and repetitions as indicated. Date:  7/18/23 Date:   Date:     Activity/Exercise Parameters Parameters Parameters   Pelvic braced 2x8 coordinated with breathing  Added to HEP (handout given)                   THERAPEUTIC ACTIVITY: (15 minutes): Functional activity education regarding anatomy, pathology and role of pelvic floor muscle (PFM) function in relation to presenting symptoms and role of pelvic floor therapy in conservative treatment., Instruction on coordinated pelvic floor and diaphragmatic breathing to improve kinesthetic awareness of pelvic muscle mobility and restore proper motor recruitment patterns with breathing, posture, and functional movement (e.g. appropriate lift/contraction with increased IAP such as a cough, laugh, sneeze to prevent incontinence as well as appropriate relaxation/drop to reduce pain with vaginal penetration). , and Patient education (see chart)  TA Educational Topic Notes Date Completed   Pathology/Anatomy/PFM Function Reviewed 7/18/23   Bladder health education     Urinary urge suppression     The knack     Voiding strategies     Nocturia tips     Bowel/Bladder log     Bowel health education     Constipation care     Diarrhea/Fecal leakage     Colonic massage     Toilet positioning     Defecation dynamics     Sources of fiber     Return to intercourse/vaginal trainers/wand     Perineal massage     Sexual positioning     Lubricants/vaginal moisturizers     Vulvovaginal health/vaginal irritants     Body mechanics     Posture/ergonomics     Diaphragmatic breathing Practiced 7/18/23   Pain science education

## 2023-07-17 NOTE — THERAPY EVALUATION
Chery Mirelesigham  : 1945  Primary:  (No info available)  Secondary:  [facility] [address] [address]  Phone: [phone]  Fax: [phone] Plan Frequency: 1x/week for 90 days    Plan of Care/Certification Expiration Date: 10/16/23      PT Visit Info:  Plan Frequency: 1x/week for 90 days  Plan of Care/Certification Expiration Date: 10/16/23  Total # of Visits to Date: 1  Progress Note Due Date: 10/16/23 (or at 10th visit)      Visit Count:  1    OUTPATIENT PHYSICAL THERAPY:  OP NOTE TYPE: Initial Assessment 2023  Episode (PFPT) Appt Desk         Treatment Diagnosis:  Lack of coordination (muscle incoordination) (R27.8)  Pelvic floor dysfunction in female (M62.98)  Stress incontinence (female) (male) (N39.3)  Hesitancy of micturition (R39.11)  Pelvic and perineal pain (R10.2)  Myalgia (M79.1)  Medical/Referring Diagnosis:  Stress incontinence (female) (male) [N39.3]  Weakness of pelvic floor [N81.89]   Referring Physician:  Rose Marie Will DO MD Orders:  PT Eval and Treat   Return MD Appt:  23  Date of Onset:  Onset Date:  ()      Allergies:  Latex, Cefaclor, Lisinopril, Adhesive tape, and Sulfa antibiotics  Restrictions/Precautions:    Restrictions/Precautions: None  Required Braces or Orthoses?: No        Medications Last Reviewed:  2023     SUBJECTIVE   History of Injury/Illness (Reason for Referral):  Ms. Xavier Martinez is a 69 yo female referred to pelvic floor physical therapy (PFPT) by Rose Marie Will DO 2/2 Stress incontinence (female) (male) [N39.3]  Weakness of pelvic floor [N81.89].     Started at least 5 years ago, mild urinary leakage  As she got more sedentary (when Covid happened), it got worse  Got a pessary with Dr. Courtney Arita, not helpful several months later  Saw Dr. Orlando Petersen and removed the pessary and that was helpful with bladder/urinary function  Leakage is significant when she sleeps, takes sleeping medication (Zolpidem as

## 2023-07-18 ENCOUNTER — HOSPITAL ENCOUNTER (OUTPATIENT)
Dept: PHYSICAL THERAPY | Age: 78
Setting detail: RECURRING SERIES
Discharge: HOME | End: 2023-07-21
Payer: MEDICARE

## 2023-07-18 PROCEDURE — 97530 THERAPEUTIC ACTIVITIES: CPT

## 2023-07-18 PROCEDURE — 97162 PT EVAL MOD COMPLEX 30 MIN: CPT

## 2023-07-18 PROCEDURE — 97110 THERAPEUTIC EXERCISES: CPT

## 2023-07-18 ASSESSMENT — PAIN SCALES - GENERAL: PAINLEVEL_OUTOF10: 0

## 2023-07-24 ENCOUNTER — HOSPITAL ENCOUNTER (OUTPATIENT)
Dept: PHYSICAL THERAPY | Age: 78
Setting detail: RECURRING SERIES
Discharge: HOME | End: 2023-07-27
Payer: MEDICARE

## 2023-07-24 PROCEDURE — 97530 THERAPEUTIC ACTIVITIES: CPT

## 2023-07-24 ASSESSMENT — PAIN SCALES - GENERAL: PAINLEVEL_OUTOF10: 0

## 2023-07-24 NOTE — PROGRESS NOTES
(0 minutes): Soft tissue mobilization was utilized and necessary because of the patient's painful spasm and restricted motion of soft tissue. Date Type Location Comments   7/24/2023 Internal assessment/treatment Via vaginal canal Brief SCS to PFM - NOT TODAY                                       (Used abbreviations: MET - muscle energy technique; SCS- Strain counter strain; CTM-Connective tissue mobilizations; CR- Contract/Relax; SP- Sustained pressure; PIT- Positional inhibition techniques; STM Soft -tissue mobilization; MM- Myofascial mobilization; TrP-Trigger point release; IASTM- Instrument assisted soft tissue mobilizations, TDN-Trigger point dry needling)    Pt gives verbal consent to internal vaginal assessment/treatment without chaperon present. - NOT PERFORMED TODAY    Treatment/Session Summary:    >Treatment Assessment:   Response to treatment: Pt verbalized good understanding of bladder health education, body/breathing mechanics, and voiding strategies to improve bladder emptying. Pt reports good understanding of plan of care, as well as prescribed home exercise program (pelvic brace, bladder health, body/breathing mechanics, voiding strategies). All questions were answered to pt's satisfaction. Pt was invited to call with any further questions or concerns. Communication/Consultation:  None today  Equipment provided today:  None  Recommendations/Intent for next treatment session: Next visit will focus on PFM coordination, practice with pelvic brace, attempt isolated PFM activity exercises (tactile cueing as needed/appropriate), practice with pressure management strategies (body/breathing mechanics).   Variation from POC: N/A    >Total Treatment Billable Duration: 55 minutes  Time In: 1433  Time Out: 7[facility], PT       Charge Capture   Post Session Pain  PT Visit 25 [facility]  MD Guidelines  Scanned Media  Benefits  MyChart    Future Appointments   Date Time Provider Department

## 2023-08-01 ENCOUNTER — HOSPITAL ENCOUNTER (OUTPATIENT)
Dept: PHYSICAL THERAPY | Age: 78
Setting detail: RECURRING SERIES
Discharge: HOME | End: 2023-08-04
Payer: MEDICARE

## 2023-08-01 PROCEDURE — 97110 THERAPEUTIC EXERCISES: CPT

## 2023-08-01 PROCEDURE — 97530 THERAPEUTIC ACTIVITIES: CPT

## 2023-08-01 ASSESSMENT — PAIN SCALES - GENERAL: PAINLEVEL_OUTOF10: 0

## 2023-08-01 NOTE — PROGRESS NOTES
release; IASTM- Instrument assisted soft tissue mobilizations, TDN-Trigger point dry needling)    Pt gives verbal consent to internal vaginal assessment/treatment without chaperon present. - NOT PERFORMED TODAY    Treatment/Session Summary:    >Treatment Assessment:   Response to treatment: Pt verbalized good understanding of bladder health education, body/breathing mechanics, and voiding strategies to improve bladder emptying. Pt did well with initiation of hip and abdominal stabilization exercises. Pt reports good understanding of plan of care, as well as prescribed home exercise program (pelvic brace, bridge + PFM, lift/lift - lower/lower, bladder health, body/breathing mechanics, voiding strategies). All questions were answered to pt's satisfaction. Pt was invited to call with any further questions or concerns. Communication/Consultation:  None today  Equipment provided today:  None  Recommendations/Intent for next treatment session: Next visit will focus on PFM coordination, practice with pelvic brace, attempt isolated PFM activity exercises (tactile cueing as needed/appropriate), practice with pressure management strategies (body/breathing mechanics), progression of hip/abdominal stabilization exercises, nocturia management and sleep hygiene education as needed.   Variation from POC: N/A    >Total Treatment Billable Duration: 55 minutes  Time In: 1408  Time Out: 222 Niall Barrera PT       Charge Capture   Post Session Pain  PT Visit Info  95 [facility] Portal  MD Guidelines  Scanned Media  Benefits  MyChart    Future Appointments   Date Time Provider [address]   8/7/2023 10:00 AM Burton Hendricks DO [facility] AMB   8/8/2023  1:00 PM Recardo Claude, PT [facility] SFE   8/15/2023  3:00 PM Recardo Claude, PT [facility]   8/29/2023  3:00 PM Recardo Claude, PT [facility]

## 2023-08-08 ENCOUNTER — HOSPITAL ENCOUNTER (OUTPATIENT)
Dept: PHYSICAL THERAPY | Age: 78
Setting detail: RECURRING SERIES
Discharge: HOME | End: 2023-08-11
Payer: MEDICARE

## 2023-08-08 PROCEDURE — 97110 THERAPEUTIC EXERCISES: CPT

## 2023-08-08 PROCEDURE — 97530 THERAPEUTIC ACTIVITIES: CPT

## 2023-08-08 ASSESSMENT — PAIN SCALES - GENERAL: PAINLEVEL_OUTOF10: 0

## 2023-08-15 ENCOUNTER — HOSPITAL ENCOUNTER (OUTPATIENT)
Dept: PHYSICAL THERAPY | Age: 78
Setting detail: RECURRING SERIES
Discharge: HOME | End: 2023-08-18
Payer: MEDICARE

## 2023-08-15 PROCEDURE — 97110 THERAPEUTIC EXERCISES: CPT

## 2023-08-15 PROCEDURE — 97530 THERAPEUTIC ACTIVITIES: CPT

## 2023-08-15 ASSESSMENT — PAIN SCALES - GENERAL: PAINLEVEL_OUTOF10: 0

## 2023-08-15 NOTE — PROGRESS NOTES
Alize Ritchie  : 1945  Primary:  (No info available)  Secondary:  [facility] [address] [address]  Phone: [phone]  Fax: [phone] Plan Frequency: 1x/week for 90 days    Plan of Care/Certification Expiration Date: 10/16/23      >PT Visit Info:  Plan Frequency: 1x/week for 90 days  Plan of Care/Certification Expiration Date: 10/16/23  Total # of Visits to Date: 5  Progress Note Due Date: 10/16/23 (or at 10th visit)      Visit Count:  5    OUTPATIENT PHYSICAL THERAPY:OP NOTE TYPE: OP Note Type: Treatment Note 8/15/2023       Episode  }Appt Desk             Treatment Diagnosis:  Lack of coordination (muscle incoordination) (R27.8)  Pelvic floor dysfunction in female (M62.98)  Stress incontinence (female) (male) (N39.3)  Hesitancy of micturition (R39.11)  Pelvic and perineal pain (R10.2)  Myalgia (M79.1)  Medical/Referring Diagnosis:  Stress incontinence (female) (male) [N39.3]  Weakness of pelvic floor [N81.89]  Referring Physician:  Edvin Swift DO (returns 23)  MD Orders:  PT Eval and Treat   Date of Onset:  Onset Date:  ()     Allergies:   Latex, Cefaclor, Lisinopril, Adhesive tape, and Sulfa antibiotics  Restrictions/Precautions:  Restrictions/Precautions: None  Required Braces or Orthoses?: No  No data recorded     Interventions Planned (Treatment may consist of any combination of the following):    Current Treatment Recommendations: Strengthening; ROM; Endurance training; Neuromuscular re-education; Manual; Pain management; Home exercise program; Patient/Caregiver education & training; Modalities; Therapeutic activities       >Subjective Comments:    At evaluation: ERIC (cough/sneeze, activity), no UUI, 7-8 PPD, UF 7x/day and 1x/night, leakage at night while asleep (takes sleeping medication as needed), takes diuretic medication, mild laxity in anterior vaginal wall    3 pads filled up in a row before bed after our last

## 2023-08-29 ENCOUNTER — HOSPITAL ENCOUNTER (OUTPATIENT)
Dept: PHYSICAL THERAPY | Age: 78
Setting detail: RECURRING SERIES
Discharge: HOME | End: 2023-09-01
Payer: MEDICARE

## 2023-08-29 PROCEDURE — 97110 THERAPEUTIC EXERCISES: CPT

## 2023-08-29 PROCEDURE — 97530 THERAPEUTIC ACTIVITIES: CPT

## 2023-08-29 ASSESSMENT — PAIN SCALES - GENERAL: PAINLEVEL_OUTOF10: 0

## 2023-08-29 NOTE — PROGRESS NOTES
void of the day has a little bit of a second void, will wait and more will come out, does feel empty after that  Still having ERIC with cough/sneeze, feels like these \"sneak up\" on her and she can't try to do a kegel with it    Overall quantity of leakage is reduced (frequency and amount)  Has cut back on sleeping medication as it hasn't helped her sleep much, so leakage at night has been better (not much at all, this is greatly improved)    Water intake: around 56-64 oz of plain water    >Initial:     0/10 >Post Session:       0/10  Medications Last Reviewed:  7/18/2023  Updated Objective Findings:   See below    8/29/23:  PFM Strength (MMT): 3-4/5    No tenderness at PFM with palpation today      8/15/23:  PFM Strength (MMT): 3-4/5  QF in 10 seconds: 6    Palpation: via vaginal canal   Superficial Pelvic Floor Musculature (PFM): Tender? Intermediate PFM Tender? Deep PFM Tender? Superficial Transverse Perineal [] Right  [] Left  []DNT Deep Transverse Perineal [] Right  [x] Left  []DNT Puborectalis [] Right  [] Left  []DNT   Ischiocavernosus [] Right  [] Left  []DNT Compressor Urethra [] Right  [] Left  []DNT Pubococcygeus [] Right  [] Left  []DNT   Bulbocavernosus [] Right  [] Left  []DNT   Iliococcygeus [] Right  [] Left  []DNT       Obturator Internus [] Right  [x] Left  []DNT       Coccygeus [] Right  [] Left  []DNT       Treatment   THERAPEUTIC EXERCISE: (24 minutes):  Exercises per grid below to improve mobility, strength, and coordination. Required minimal visual, verbal, and tactile cues to promote proper body mechanics and promote proper body breathing techniques. Progressed resistance, range, and repetitions as indicated.    Date:  7/18/23 Date:  8/1/23 Date:  8/8/23 Date:  8/15/23 Date:  8/29/23   Activity/Exercise Parameters Parameters Parameters Parameters Parameters   Pelvic brace 2x8 coordinated with breathing  Added to HEP (handout given) Discussed positioning for exercises (supine, reclined,

## 2023-09-05 ENCOUNTER — HOSPITAL ENCOUNTER (OUTPATIENT)
Dept: PHYSICAL THERAPY | Age: 78
Setting detail: RECURRING SERIES
End: 2023-09-05
Payer: MEDICARE

## 2023-09-05 NOTE — PROGRESS NOTES
Fabio Pugh  : 1945  Primary: Medicare Part A And B  Secondary: [employer] Bushra Hernandez @ [address] [place] [address]  Phone: [phone]  Fax: [phone]    PT Visit Info: Total # of Visits to Date: 6  Progress Note Due Date: 10/16/23 (or at 10th visit)     OT Visit Info:  No data recorded    OUTPATIENT THERAPY: 2023  Episode  Appt Desk        Fabio Pugh cancelled her appointment for today due to  knee problem . Will plan to follow up next during next appointment.   Thank you,  Dilan Fournier, PT    Future Appointments   Date Time Provider [address]   2023  2:00 PM Dilan Fournier [facility]   2023  3:00 PM Mahad Jj, PT [facility] SFE   2023  2:00 PM Mahad Jj PT [facility] SFE   2023  2:00 PM Mahad Jj, PT [facility] SFE   10/9/2023 10:00 AM Gerri Smith DO [facility] AMB

## 2023-09-12 ENCOUNTER — HOSPITAL ENCOUNTER (OUTPATIENT)
Dept: PHYSICAL THERAPY | Age: 78
Setting detail: RECURRING SERIES
Discharge: HOME | End: 2023-09-15
Payer: MEDICARE

## 2023-09-12 PROCEDURE — 97530 THERAPEUTIC ACTIVITIES: CPT

## 2023-09-12 PROCEDURE — 97110 THERAPEUTIC EXERCISES: CPT

## 2023-09-12 ASSESSMENT — PAIN SCALES - GENERAL: PAINLEVEL_OUTOF10: 0

## 2023-09-12 NOTE — PROGRESS NOTES
Discussed vulvar hygiene (pt used hand  on vulva in bathroom during session - lengthy discussion on appropriate ways to clean vulvar tissue), handout provided 9/12/23   Body mechanics Demonstrated and discussed STS, log roll, and lifting mechanics and emphasis on exhale on exertion, handout provided  Practiced when getting onto and off of the exercise mat on the floor, cues for exhale on exertion  Reviewed, discussed exhalation with exertion with a variety of movements  Reviewed all of the above 7/24/23 8/1/23 8/8/23 8/29/23   Posture/ergonomics     Diaphragmatic breathing Practiced 7/18/23  8/1/23  8/8/23  8/15/23  8/29/23   Pain science education     Resources and technology     Other patient education Re: effect of increased IAP on pelvic floor and urinary function and part of PFPT will be emphasizing pressure dynamics and pressure management  Re: sleep hygiene, handout provided 7/18/23        8/15/23     MANUAL THERAPY: (0 minutes): Soft tissue mobilization was utilized and necessary because of the patient's painful spasm and restricted motion of soft tissue. Date Type Location Comments   9/12/2023 Internal assessment/treatment Via vaginal canal Brief SCS to PFM - NOT TODAY                                       (Used abbreviations: MET - muscle energy technique; SCS- Strain counter strain; CTM-Connective tissue mobilizations; CR- Contract/Relax; SP- Sustained pressure; PIT- Positional inhibition techniques; STM Soft -tissue mobilization; MM- Myofascial mobilization; TrP-Trigger point release; IASTM- Instrument assisted soft tissue mobilizations, TDN-Trigger point dry needling)    Pt gives verbal consent to internal vaginal assessment/treatment without chaperon present. Treatment/Session Summary:    >Treatment Assessment:   Response to treatment: Pt did well with PFM activation exercises today, improved coordination with exercises.  Pt verbalized good understanding of the knack (how

## 2023-09-19 ENCOUNTER — HOSPITAL ENCOUNTER (OUTPATIENT)
Dept: PHYSICAL THERAPY | Age: 78
Setting detail: RECURRING SERIES
Discharge: HOME | End: 2023-09-22
Payer: MEDICARE

## 2023-09-19 PROCEDURE — 97530 THERAPEUTIC ACTIVITIES: CPT

## 2023-09-19 ASSESSMENT — PAIN SCALES - GENERAL: PAINLEVEL_OUTOF10: 0

## 2023-09-19 NOTE — PROGRESS NOTES
support) to improve bladder emptying., Bladder/bowel diary reviewed this visit with patient able to identify impaired behavior patterns/triggers of bowel/bladder irritants/dysfunction with verbal cues in order to reveal contributions to symptoms, patterns, and increase patient's awareness of condition. , and Patient education (see chart)  TA Educational Topic Notes Date Completed   Pathology/Anatomy/PFM Function Reviewed 7/18/23   Bladder health education Discussed bladder irritants and healthy bladder habits, handout provided  Reviewed importance of water intake and appropriate pace at which to increase  Discussed checklist for symptom increase (bladder irritants vs water, change in activity level, change in stress level, bowel health, medication changes, time of day), wrote down and provided to patient  Discussed avoiding excessive delay of void and appropriate urinary frequency, also discussed appropriate water intake in proportion to other fluids 7/24/23    8/1/23  8/8/23    8/15/23            9/19/23     Urinary urge suppression Discussed using DB for urinary urge suppression/control 9/19/23   The mariluz Discussed coordination of PFM with cough/sneeze as well as with lift/bend/laugh as needed, discussed mechanics and result of increased IAP, handout provided  Reviewed mechanics and appropriate times for use 8/29/23 9/12/23   Voiding strategies Discussed and demonstrated toilet positioning, double voiding, and external and internal splinting, handout provided  Reviewed voiding techniques, demonstrated toilet positioning and double voiding again  Reviewed all voiding strategies, demonstrated external and internal splinting, answered patient questions  Reviewed above 7/24/23 8/1/23 8/8/23 8/29/23 9/19/23   Nocturia tips Discussed factors affecting nighttime urinary frequency/urgency/leakage as well as lifestyle changes to avoid these symptoms, handout provided 8/15/23   Bowel/Bladder log

## 2023-09-26 ENCOUNTER — HOSPITAL ENCOUNTER (OUTPATIENT)
Dept: PHYSICAL THERAPY | Age: 78
Setting detail: RECURRING SERIES
End: 2023-09-26
Payer: MEDICARE

## 2023-09-26 NOTE — PROGRESS NOTES
Charan Ramirez  : 1945  Primary: Medicare Part A And B  Secondary: [employer] Bushra Hernandez @ [address] [place] [address]  [address] [facility] [address]  Phone: [phone]  Fax: [phone]    PT Visit Info: Total # of Visits to Date: 8  Progress Note Due Date: 10/16/23 (or at 10th visit)  Canceled Appointment: 2     OT Visit Info:  No data recorded    OUTPATIENT THERAPY: 2023  Episode  Appt Desk      Charan Ramirez cancelled (<24 hours) her appointment for today due to conflicting appointment and illness (knee pain - seeing doctor about it at 1pm). Will plan to follow up during next scheduled appointment.     Thank you,  Debbie Vick, PT    Future Appointments   Date Time Provider [address]   2023  2:00 PM Debbie Vick, PT [facility]   10/3/2023  2:00 PM Debbie Vick, PT [facility] SFE   10/17/2023  1:00 PM Debbie Vick, PT [facility] SFE   10/24/2023  2:00 PM Debbie Vick, PT [facility] SFE   10/25/2023  3:15 PM Tesfaye Mahoney DO [facility] AMB   10/31/2023  2:00 PM Debbie Vick, PT [facility] SFE

## 2023-10-03 ENCOUNTER — HOSPITAL ENCOUNTER (OUTPATIENT)
Dept: PHYSICAL THERAPY | Age: 78
Setting detail: RECURRING SERIES
Discharge: HOME | End: 2023-10-06
Payer: MEDICARE

## 2023-10-03 PROCEDURE — 97110 THERAPEUTIC EXERCISES: CPT

## 2023-10-03 PROCEDURE — 97530 THERAPEUTIC ACTIVITIES: CPT

## 2023-10-03 ASSESSMENT — PAIN SCALES - GENERAL: PAINLEVEL_OUTOF10: 0

## 2023-10-03 NOTE — PROGRESS NOTES
needed/appropriate), practice with pressure management strategies (body/breathing mechanics), progression of hip/abdominal stabilization exercises, review of nocturia management and sleep hygiene education as needed, review of vulvar hygiene practices as needed, urinary urge suppression as needed.   Variation from POC: Pt out of town next week, will follow-up in two weeks    >Total Treatment Billable Duration: 42 minutes  Time In: [address]  Time Out: 59 Ibarra [place], 2021 [facility] Session Pain  PT Visit Info  95 [facility] Portal  MD Guidelines  Scanned Media  Benefits  MyChart    Future Appointments   Date Time Provider [address]   10/17/2023  1:00 PM Geofm Massing, PT [facility] SFE   10/24/2023  2:00 PM Geofm Massing, PT [facility] SFE   10/25/2023  3:15 PM Sam Smith DO [facility] AMB   10/31/2023  2:00 PM Geofm Massing, PT [facility] E

## 2023-10-03 NOTE — THERAPY RECERTIFICATION
activities       Goals: (Goals have been discussed and agreed upon with patient.)  Short-Term Functional Goals: Time Frame: 6 weeks  Patient will demonstrate independence with basic pelvic floor muscle (PFM) home exercises program (HEP) to improve awareness, coordination, and timing of PFM. PROGRESSING  Patient will demonstrate understanding of and ability to teach back appropriate water intake, bladder irritants, toileting frequency, and positioning for improved self-management of symptoms. PROGRESSING  Patient will demonstrate ability to isolate a pelvic floor contraction without breath holding and minimal to no accessory muscle use in order to implement the knack and/or urge suppression, reducing pad usage by 50%. GOAL MET  Patient will demonstrate appropriate co-contraction of the transversus abdominis and pelvic floor muscle group during exhalation in order to reduce IAP and improve functional task performance including squatting, lifting, bending. PROGRESSING  Discharge Goals: Time Frame: 12 weeks  Patient will demonstrate ability to voluntarily contract the pelvic floor muscles prior to a cough or sneeze for decreased leakage during increases in intra-abdominal pressure. PROGRESSING  Patient will demonstrate independence with an advanced HEP for general conditioning, core stabilization, and mobility to facilitate carry over and independent management of symptoms. PROGRESSING   Patient will improve outcome score by 12%. ONGOING       Outcome Measure:   Pelvic Floor Impact Questionnaire--short form 7 (PFIQ-7):  Score:  Initial: 7/18/23 (11.11% impairment)  Bladder or Urine: 33.33/100  Bowel or Rectum: 0/100  Vagina or Pelvis: 0/100 Most Recent: 10/3/23 (12.70% impairment)  Bladder or Urine: 38.10/100  Bowel or Rectum: 0/100  Vagina or Pelvis: 0/100   Interpretation of Score: Each of the 7 sections is scored on a scale from 0-3; 0 representing \"Not at all\", 3 representing \"Quite a bit\".  The mean value is taken

## 2023-10-10 ENCOUNTER — APPOINTMENT (OUTPATIENT)
Dept: PHYSICAL THERAPY | Age: 78
End: 2023-10-10
Payer: MEDICARE

## 2023-10-17 ENCOUNTER — HOSPITAL ENCOUNTER (OUTPATIENT)
Dept: PHYSICAL THERAPY | Age: 78
Setting detail: RECURRING SERIES
End: 2023-10-17
Payer: MEDICARE

## 2023-10-17 NOTE — THERAPY DISCHARGE
Maliha Nunez [address]  [facility] [address]  Phone: [phone]  Fax: [phone]    OUTPATIENT PHYSICAL THERAPY  Discontinuation Summary 10/17/2023  Episode  Appt Desk         Martha Del Rio has been seen in physical therapy for 9  visits from 7/18/23 to 10/17/23, with 3 cancellations and 0 no shows. Ms. Jacobs Covert therapy has come to an end at this time due to: Patient did not return for/schedule additional treatment (pt cancelled all remaining appointments, requesting to be discharged). Physical Therapy Goals:  Unable to assess at time of discharge.     Rosio Quinones, PT

## 2023-10-17 NOTE — PROGRESS NOTES
Alena Serna  : 1945  Primary: Medicare Part A And B  Secondary: [employer] Bushra Hernandez @ [address] [place] [address]  Phone: [phone]  Fax: [phone]    PT Visit Info: Total # of Visits to Date: 9  Progress Note Due Date: 24 (or at 20th visit)  Canceled Appointment: 3     OT Visit Info:  No data recorded    OUTPATIENT THERAPY: 10/17/2023  Episode  Appt Desk      Alena Serna cancelled her appointment for today, and all remaining appointments, due to  busy schedule, would like to discontinue PT for now . Patient understands that if she wishes to return to PT she will need a new referral. See discontinuation note from today.     Thank you,  Ty Abbott, PT    Future Appointments   Date Time Provider [address]   10/17/2023  7:00 PM Ty Abbott, [facility] SFE   10/25/2023  3:15 PM Bethel Smith, DO [facility] AMB

## 2023-10-24 ENCOUNTER — APPOINTMENT (OUTPATIENT)
Dept: PHYSICAL THERAPY | Age: 78
End: 2023-10-24
Payer: MEDICARE

## 2023-10-31 ENCOUNTER — APPOINTMENT (OUTPATIENT)
Dept: PHYSICAL THERAPY | Age: 78
End: 2023-10-31
Payer: MEDICARE

## 2023-11-22 ENCOUNTER — OFFICE VISIT (OUTPATIENT)
Dept: UROGYNECOLOGY | Age: 78
End: 2023-11-22
Payer: MEDICARE

## 2023-11-22 DIAGNOSIS — N39.3 SUI (STRESS URINARY INCONTINENCE, FEMALE): ICD-10-CM

## 2023-11-22 PROCEDURE — 0509F URINE INCON PLAN DOCD: CPT | Performed by: OBSTETRICS & GYNECOLOGY

## 2023-11-22 PROCEDURE — 1090F PRES/ABSN URINE INCON ASSESS: CPT | Performed by: OBSTETRICS & GYNECOLOGY

## 2023-11-22 PROCEDURE — G8484 FLU IMMUNIZE NO ADMIN: HCPCS | Performed by: OBSTETRICS & GYNECOLOGY

## 2023-11-22 PROCEDURE — G8400 PT W/DXA NO RESULTS DOC: HCPCS | Performed by: OBSTETRICS & GYNECOLOGY

## 2023-11-22 PROCEDURE — 1123F ACP DISCUSS/DSCN MKR DOCD: CPT | Performed by: OBSTETRICS & GYNECOLOGY

## 2023-11-22 PROCEDURE — G8417 CALC BMI ABV UP PARAM F/U: HCPCS | Performed by: OBSTETRICS & GYNECOLOGY

## 2023-11-22 PROCEDURE — G8427 DOCREV CUR MEDS BY ELIG CLIN: HCPCS | Performed by: OBSTETRICS & GYNECOLOGY

## 2023-11-22 PROCEDURE — 99215 OFFICE O/P EST HI 40 MIN: CPT | Performed by: OBSTETRICS & GYNECOLOGY

## 2023-11-22 PROCEDURE — 1036F TOBACCO NON-USER: CPT | Performed by: OBSTETRICS & GYNECOLOGY

## 2023-11-22 NOTE — PROGRESS NOTES
bleeding, infections, injury to pelvic organs (bladder, nerves, vessels, urethra, and ureters), recurrence, urinary retention, dyspareunia, anesthetic complications, and death. We discussed that other complications could arise and that the list is too long to discuss comprehensively. She is consented for a midurethral (TOT) sling and cystoscopy, possible anterior wall repair. No follow-ups on file. On this date 11/22/2023 I have spent 43 minutes reviewing previous notes, test results and face to face with the patient discussing the diagnosis and importance of compliance with the treatment plan as well as documenting on the day of the visit.                   Annelise Tracy,

## 2024-01-22 ENCOUNTER — TELEPHONE (OUTPATIENT)
Dept: UROGYNECOLOGY | Age: 79
End: 2024-01-22

## 2024-01-22 NOTE — TELEPHONE ENCOUNTER
Patient called with concerns of a cough for over a month since December she have tried several different antibiotics with her PCP she will finish her last dose on Jan 27th she wants to know if that's plenty of time to be clear for her surgery on Feb 6?      Next, on her Clearance you ask for a cardio clearance she does not have one she only sees PCP, the current medication Cardizem is being used for HTN  not A-fib. The PCP clearance is in your yellow folder for review.    TIA

## 2024-01-24 ENCOUNTER — PREP FOR PROCEDURE (OUTPATIENT)
Dept: UROGYNECOLOGY | Age: 79
End: 2024-01-24

## 2024-01-24 DIAGNOSIS — N39.3 FEMALE STRESS INCONTINENCE: ICD-10-CM

## 2024-01-25 ENCOUNTER — TELEPHONE (OUTPATIENT)
Dept: UROGYNECOLOGY | Age: 79
End: 2024-01-25

## 2024-01-25 NOTE — TELEPHONE ENCOUNTER
FYI:  Patient wants to postpone surgery due to new dx of asthma and sinus infection, she was instructed that she will need new medical clearance since she is considering later in the spring and she is scheduled for surgery Feb 6th., she is not feeling confident enough to have surgery she wants to be well, she was instructed to call the office when she is ready.

## (undated) DEVICE — SYR 3ML LL TIP 1/10ML GRAD --

## (undated) DEVICE — KENDALL RADIOLUCENT FOAM MONITORING ELECTRODE RECTANGULAR SHAPE: Brand: KENDALL

## (undated) DEVICE — CANNULA NSL ORAL AD FOR CAPNOFLEX CO2 O2 AIRLFE

## (undated) DEVICE — NDL PRT INJ NSAF BLNT 18GX1.5 --

## (undated) DEVICE — SYR 5ML 1/5 GRAD LL NSAF LF --

## (undated) DEVICE — FORCEPS BX L240CM JAW DIA2.8MM L CAP W/ NDL MIC MESH TOOTH

## (undated) DEVICE — CONTAINER PREFIL FRMLN 40ML --

## (undated) DEVICE — CONNECTOR TBNG OD5-7MM O2 END DISP